# Patient Record
Sex: MALE | Race: WHITE
[De-identification: names, ages, dates, MRNs, and addresses within clinical notes are randomized per-mention and may not be internally consistent; named-entity substitution may affect disease eponyms.]

---

## 2019-07-09 ENCOUNTER — HOSPITAL ENCOUNTER (EMERGENCY)
Dept: HOSPITAL 26 - MED | Age: 79
End: 2019-07-09
Payer: COMMERCIAL

## 2019-07-09 VITALS — HEIGHT: 68 IN | BODY MASS INDEX: 25.76 KG/M2 | WEIGHT: 170 LBS

## 2019-07-09 DIAGNOSIS — I46.9: Primary | ICD-10-CM

## 2019-07-09 DIAGNOSIS — E11.649: ICD-10-CM

## 2019-07-09 DIAGNOSIS — I10: ICD-10-CM

## 2019-07-09 PROCEDURE — 92950 HEART/LUNG RESUSCITATION CPR: CPT

## 2019-07-09 PROCEDURE — 99285 EMERGENCY DEPT VISIT HI MDM: CPT

## 2019-07-09 PROCEDURE — 31500 INSERT EMERGENCY AIRWAY: CPT

## 2019-07-09 PROCEDURE — 96375 TX/PRO/DX INJ NEW DRUG ADDON: CPT

## 2019-07-09 PROCEDURE — 96374 THER/PROPH/DIAG INJ IV PUSH: CPT

## 2019-07-09 NOTE — NUR
EMILE CALL-[phone]

-------------------------------------------------------------------------------

Addendum: 07/09/19 at 0331 by [email or web address]

-------------------------------------------------------------------------------

VIVIANE CALL-[phone]

## 2019-07-09 NOTE — NUR
SPOKE TO REPRESENTATIVE FROM  OFFICE, ALFREDA REMY PT IS NOT A 
CORONERS CASE AND IS RELEASED, CASE # [id number]
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
## 2019-07-09 NOTE — NUR
ACLS CPR INITIATED PER ER STAFF, STARTED COMPRESSIONS AND GAVE MEDICATION/EPI, 
ERMD TO INTUBATE AND CONTINUE CPR.

## 2019-07-09 NOTE — NUR
ACLS CPR WAS CONTINUED, NO PULSE/ASYSTOLE. SEE CODE BLUE RECORD FOR 
MEDICATIONS, CHEST COMPRESSION/PULSE CHECKS THAT WERE NOTED. CONTINUE WITH ACLS 
CPR
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
## 2019-07-09 NOTE — NUR
TOD WAS CALLED BY BRYAN. PT WAS ASYSTOLE. CPR WAS STOPPED AT THIS TIME. PT HAS 
. SEE CODE BLUE SHEET FOR FURTHER INFORMATION DURING THE CODE. WILL 
CONTACT [facility]EXT OF [facility], , AND [facility] FOR POST MORTUM CARE. BRYAN MADE 
AWARE OF STATUS.

-------------------------------------------------------------------------------

Addendum: 19 at 0514 by [email or web address]

-------------------------------------------------------------------------------

TIME OF DEATH WAS CALLED BY BRYAN. PT WAS ASYSTOLE. CPR WAS STOPPED AT THIS 
TIME. PT HAS . SEE CODE BLUE SHEET FOR FURTHER INFORMATION DURING THE 
CODE. WILL CONTACT [facility]EXT OF [facility], , AND [facility] FOR POST MORTUM CARE. 
BRYAN MADE AWARE OF STATUS.

## 2019-07-09 NOTE — NUR
PT WAS INTUBATED BY ERMD, CONTINUE TO PERFORM ACLS CPR. PT CURRENTLY ASYSTOLE. 
SEE CODE BLUE SHEET FOR MEDICATIONS AND TIMES INFUSED.
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
## 2019-07-09 NOTE — NUR
CALLED FAMILY SON/WIFE FOR THE SECOND TIME, NOT ABLE TO LEAVE A MESSAGE DUE TO 
MAIL BOX BEING FULL.